# Patient Record
Sex: FEMALE | URBAN - METROPOLITAN AREA
[De-identification: names, ages, dates, MRNs, and addresses within clinical notes are randomized per-mention and may not be internally consistent; named-entity substitution may affect disease eponyms.]

---

## 2019-03-08 ENCOUNTER — APPOINTMENT (RX ONLY)
Dept: URBAN - METROPOLITAN AREA CLINIC 138 | Facility: CLINIC | Age: 59
Setting detail: DERMATOLOGY
End: 2019-03-08

## 2019-03-08 DIAGNOSIS — L27.0 GENERALIZED SKIN ERUPTION DUE TO DRUGS AND MEDICAMENTS TAKEN INTERNALLY: ICD-10-CM

## 2019-03-08 PROBLEM — E78.5 HYPERLIPIDEMIA, UNSPECIFIED: Status: ACTIVE | Noted: 2019-03-08

## 2019-03-08 PROBLEM — I10 ESSENTIAL (PRIMARY) HYPERTENSION: Status: ACTIVE | Noted: 2019-03-08

## 2019-03-08 PROBLEM — L30.9 DERMATITIS, UNSPECIFIED: Status: ACTIVE | Noted: 2019-03-08

## 2019-03-08 PROCEDURE — 11104 PUNCH BX SKIN SINGLE LESION: CPT

## 2019-03-08 PROCEDURE — ? ADDITIONAL NOTES

## 2019-03-08 PROCEDURE — ? COUNSELING

## 2019-03-08 PROCEDURE — 11105 PUNCH BX SKIN EA SEP/ADDL: CPT

## 2019-03-08 PROCEDURE — ? PRESCRIPTION

## 2019-03-08 PROCEDURE — 99213 OFFICE O/P EST LOW 20 MIN: CPT | Mod: 25

## 2019-03-08 PROCEDURE — ? BIOPSY BY PUNCH METHOD

## 2019-03-08 RX ORDER — PREDNISONE 20 MG/1
TABLET ORAL
Qty: 20 | Refills: 0 | Status: ERX | COMMUNITY
Start: 2019-03-08

## 2019-03-08 RX ORDER — BETAMETHASONE DIPROPIONATE 0.5 MG/G
CREAM TOPICAL
Qty: 1 | Refills: 3 | Status: ERX | COMMUNITY
Start: 2019-03-08

## 2019-03-08 RX ADMIN — BETAMETHASONE DIPROPIONATE: 0.5 CREAM TOPICAL at 00:00

## 2019-03-08 RX ADMIN — PREDNISONE: 20 TABLET ORAL at 00:00

## 2019-03-08 ASSESSMENT — LOCATION DETAILED DESCRIPTION DERM
LOCATION DETAILED: LEFT PROXIMAL DORSAL FOREARM
LOCATION DETAILED: RIGHT PROXIMAL DORSAL FOREARM
LOCATION DETAILED: LEFT RIB CAGE
LOCATION DETAILED: RIGHT ANTERIOR DISTAL THIGH
LOCATION DETAILED: LEFT ANTERIOR DISTAL THIGH
LOCATION DETAILED: PERIUMBILICAL SKIN

## 2019-03-08 ASSESSMENT — LOCATION SIMPLE DESCRIPTION DERM
LOCATION SIMPLE: RIGHT THIGH
LOCATION SIMPLE: LEFT THIGH
LOCATION SIMPLE: RIGHT FOREARM
LOCATION SIMPLE: ABDOMEN
LOCATION SIMPLE: LEFT FOREARM

## 2019-03-08 ASSESSMENT — LOCATION ZONE DERM
LOCATION ZONE: LEG
LOCATION ZONE: TRUNK
LOCATION ZONE: ARM

## 2019-03-08 NOTE — PROCEDURE: ADDITIONAL NOTES
Additional Notes: Pt states that she was hospitalized about three days before the rash began. She was give a few medications while hospitalized, one of which was Ativan, some other medications were also given of which she cannot recall. Pt states that she is no longer taking these medications given to her during her hospital stay. Further tx pending path. Pt states all other medications she has been on for years. Denies spouse with similar rash
Detail Level: Simple

## 2019-03-08 NOTE — PROCEDURE: BIOPSY BY PUNCH METHOD
Patient Will Remove Sutures At Home?: No
Home Suture Removal Text: Patient was provided a home suture removal kit and will remove their sutures at home.  If they have any questions or difficulties they will call the office.
Epidermal Sutures: 4-0 Ethilon
Post-Care Instructions: I reviewed with the patient in detail post-care instructions. Patient is to keep the biopsy site dry overnight, and then apply bacitracin twice daily until healed. Patient may apply hydrogen peroxide soaks to remove any crusting.
X Depth Of Punch In Cm (Optional): 0
Anesthesia Volume In Cc (Will Not Render If 0): 0.5
Wound Care: Petrolatum
Detail Level: Detailed
Punch Size In Mm: 4
Suture Removal: 10 days
Billing Type: Third-Party Bill
Lab Facility: [id number]
Anesthesia Type: 2% lidocaine without epinephrine
Render Post-Care Instructions In Note?: yes
Dressing: bandage
Consent: Written consent was obtained and risks were reviewed including but not limited to scarring, infection, bleeding, scabbing, incomplete removal, nerve damage and allergy to anesthesia.
Notification Instructions: Patient will be notified of biopsy results. However, patient instructed to call the office if not contacted within 2 weeks.
Biopsy Type: H and E
Hemostasis: Edmund's
Lab: [id number]

## 2019-03-15 ENCOUNTER — APPOINTMENT (RX ONLY)
Dept: URBAN - METROPOLITAN AREA CLINIC 138 | Facility: CLINIC | Age: 59
Setting detail: DERMATOLOGY
End: 2019-03-15

## 2019-03-15 DIAGNOSIS — Z48.02 ENCOUNTER FOR REMOVAL OF SUTURES: ICD-10-CM

## 2019-03-15 PROCEDURE — ? ADDITIONAL NOTES

## 2019-03-15 PROCEDURE — 99024 POSTOP FOLLOW-UP VISIT: CPT

## 2019-03-15 PROCEDURE — ? SUTURE REMOVAL (GLOBAL PERIOD)

## 2019-03-15 ASSESSMENT — LOCATION DETAILED DESCRIPTION DERM
LOCATION DETAILED: LEFT DISTAL DORSAL FOREARM
LOCATION DETAILED: PERIUMBILICAL SKIN

## 2019-03-15 ASSESSMENT — LOCATION ZONE DERM
LOCATION ZONE: TRUNK
LOCATION ZONE: ARM

## 2019-03-15 ASSESSMENT — LOCATION SIMPLE DESCRIPTION DERM
LOCATION SIMPLE: ABDOMEN
LOCATION SIMPLE: LEFT FOREARM

## 2019-03-15 NOTE — PROCEDURE: SUTURE REMOVAL (GLOBAL PERIOD)
Detail Level: Detailed
Add 28703 Cpt? (Important Note: In 2017 The Use Of 02512 Is Being Tracked By Cms To Determine Future Global Period Reimbursement For Global Periods): yes